# Patient Record
Sex: MALE | Race: WHITE | NOT HISPANIC OR LATINO
[De-identification: names, ages, dates, MRNs, and addresses within clinical notes are randomized per-mention and may not be internally consistent; named-entity substitution may affect disease eponyms.]

---
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
## 2022-01-06 ENCOUNTER — TRANSCRIPTION ENCOUNTER (OUTPATIENT)
Age: 22
End: 2022-01-06

## 2024-12-03 ENCOUNTER — EMERGENCY (EMERGENCY)
Facility: HOSPITAL | Age: 24
LOS: 0 days | Discharge: HOME | End: 2024-12-04
Attending: EMERGENCY MEDICINE
Payer: MEDICAID

## 2024-12-03 VITALS
TEMPERATURE: 99 F | DIASTOLIC BLOOD PRESSURE: 71 MMHG | WEIGHT: 140.88 LBS | OXYGEN SATURATION: 100 % | RESPIRATION RATE: 18 BRPM | HEART RATE: 107 BPM | SYSTOLIC BLOOD PRESSURE: 142 MMHG

## 2024-12-03 DIAGNOSIS — F32.A DEPRESSION, UNSPECIFIED: ICD-10-CM

## 2024-12-03 DIAGNOSIS — K92.0 HEMATEMESIS: ICD-10-CM

## 2024-12-03 DIAGNOSIS — Z87.19 PERSONAL HISTORY OF OTHER DISEASES OF THE DIGESTIVE SYSTEM: ICD-10-CM

## 2024-12-03 DIAGNOSIS — Z91.018 ALLERGY TO OTHER FOODS: ICD-10-CM

## 2024-12-03 DIAGNOSIS — R11.2 NAUSEA WITH VOMITING, UNSPECIFIED: ICD-10-CM

## 2024-12-03 DIAGNOSIS — R10.11 RIGHT UPPER QUADRANT PAIN: ICD-10-CM

## 2024-12-03 LAB
ALBUMIN SERPL ELPH-MCNC: 4.2 G/DL — SIGNIFICANT CHANGE UP (ref 3.3–5)
ALP SERPL-CCNC: 58 U/L — SIGNIFICANT CHANGE UP (ref 40–120)
ALT FLD-CCNC: 26 U/L — SIGNIFICANT CHANGE UP (ref 12–78)
ANION GAP SERPL CALC-SCNC: 1 MMOL/L — LOW (ref 5–17)
AST SERPL-CCNC: 19 U/L — SIGNIFICANT CHANGE UP (ref 15–37)
BASOPHILS # BLD AUTO: 0.02 K/UL — SIGNIFICANT CHANGE UP (ref 0–0.2)
BASOPHILS NFR BLD AUTO: 0.2 % — SIGNIFICANT CHANGE UP (ref 0–2)
BILIRUB SERPL-MCNC: 0.5 MG/DL — SIGNIFICANT CHANGE UP (ref 0.2–1.2)
BLD GP AB SCN SERPL QL: SIGNIFICANT CHANGE UP
BUN SERPL-MCNC: 15 MG/DL — SIGNIFICANT CHANGE UP (ref 7–23)
CALCIUM SERPL-MCNC: 9.3 MG/DL — SIGNIFICANT CHANGE UP (ref 8.5–10.1)
CHLORIDE SERPL-SCNC: 104 MMOL/L — SIGNIFICANT CHANGE UP (ref 96–108)
CO2 SERPL-SCNC: 28 MMOL/L — SIGNIFICANT CHANGE UP (ref 22–31)
CREAT SERPL-MCNC: 1.11 MG/DL — SIGNIFICANT CHANGE UP (ref 0.5–1.3)
EGFR: 96 ML/MIN/1.73M2 — SIGNIFICANT CHANGE UP
EOSINOPHIL # BLD AUTO: 0.05 K/UL — SIGNIFICANT CHANGE UP (ref 0–0.5)
EOSINOPHIL NFR BLD AUTO: 0.6 % — SIGNIFICANT CHANGE UP (ref 0–6)
FLUAV AG NPH QL: SIGNIFICANT CHANGE UP
FLUBV AG NPH QL: SIGNIFICANT CHANGE UP
GLUCOSE SERPL-MCNC: 133 MG/DL — HIGH (ref 70–99)
HCT VFR BLD CALC: 46.2 % — SIGNIFICANT CHANGE UP (ref 39–50)
HGB BLD-MCNC: 15.9 G/DL — SIGNIFICANT CHANGE UP (ref 13–17)
IMM GRANULOCYTES NFR BLD AUTO: 0.2 % — SIGNIFICANT CHANGE UP (ref 0–0.9)
LIDOCAIN IGE QN: 32 U/L — SIGNIFICANT CHANGE UP (ref 13–75)
LYMPHOCYTES # BLD AUTO: 0.59 K/UL — LOW (ref 1–3.3)
LYMPHOCYTES # BLD AUTO: 7.3 % — LOW (ref 13–44)
MCHC RBC-ENTMCNC: 30.2 PG — SIGNIFICANT CHANGE UP (ref 27–34)
MCHC RBC-ENTMCNC: 34.4 G/DL — SIGNIFICANT CHANGE UP (ref 32–36)
MCV RBC AUTO: 87.8 FL — SIGNIFICANT CHANGE UP (ref 80–100)
MONOCYTES # BLD AUTO: 0.46 K/UL — SIGNIFICANT CHANGE UP (ref 0–0.9)
MONOCYTES NFR BLD AUTO: 5.7 % — SIGNIFICANT CHANGE UP (ref 2–14)
NEUTROPHILS # BLD AUTO: 6.89 K/UL — SIGNIFICANT CHANGE UP (ref 1.8–7.4)
NEUTROPHILS NFR BLD AUTO: 86 % — HIGH (ref 43–77)
PLATELET # BLD AUTO: 186 K/UL — SIGNIFICANT CHANGE UP (ref 150–400)
POTASSIUM SERPL-MCNC: 3.6 MMOL/L — SIGNIFICANT CHANGE UP (ref 3.5–5.3)
POTASSIUM SERPL-SCNC: 3.6 MMOL/L — SIGNIFICANT CHANGE UP (ref 3.5–5.3)
PROT SERPL-MCNC: 7.8 GM/DL — SIGNIFICANT CHANGE UP (ref 6–8.3)
RBC # BLD: 5.26 M/UL — SIGNIFICANT CHANGE UP (ref 4.2–5.8)
RBC # FLD: 11.5 % — SIGNIFICANT CHANGE UP (ref 10.3–14.5)
RSV RNA NPH QL NAA+NON-PROBE: SIGNIFICANT CHANGE UP
SARS-COV-2 RNA SPEC QL NAA+PROBE: SIGNIFICANT CHANGE UP
SODIUM SERPL-SCNC: 133 MMOL/L — LOW (ref 135–145)
WBC # BLD: 8.03 K/UL — SIGNIFICANT CHANGE UP (ref 3.8–10.5)
WBC # FLD AUTO: 8.03 K/UL — SIGNIFICANT CHANGE UP (ref 3.8–10.5)

## 2024-12-03 PROCEDURE — 93005 ELECTROCARDIOGRAM TRACING: CPT

## 2024-12-03 PROCEDURE — 36415 COLL VENOUS BLD VENIPUNCTURE: CPT

## 2024-12-03 PROCEDURE — 99284 EMERGENCY DEPT VISIT MOD MDM: CPT

## 2024-12-03 PROCEDURE — 86900 BLOOD TYPING SEROLOGIC ABO: CPT

## 2024-12-03 PROCEDURE — 85025 COMPLETE CBC W/AUTO DIFF WBC: CPT

## 2024-12-03 PROCEDURE — 0241U: CPT

## 2024-12-03 PROCEDURE — 93010 ELECTROCARDIOGRAM REPORT: CPT

## 2024-12-03 PROCEDURE — 80053 COMPREHEN METABOLIC PANEL: CPT

## 2024-12-03 PROCEDURE — 86850 RBC ANTIBODY SCREEN: CPT

## 2024-12-03 PROCEDURE — 96374 THER/PROPH/DIAG INJ IV PUSH: CPT

## 2024-12-03 PROCEDURE — 86901 BLOOD TYPING SEROLOGIC RH(D): CPT

## 2024-12-03 PROCEDURE — 83690 ASSAY OF LIPASE: CPT

## 2024-12-03 PROCEDURE — 96375 TX/PRO/DX INJ NEW DRUG ADDON: CPT

## 2024-12-03 PROCEDURE — 99284 EMERGENCY DEPT VISIT MOD MDM: CPT | Mod: 25

## 2024-12-03 RX ORDER — SODIUM CHLORIDE 9 MG/ML
1000 INJECTION, SOLUTION INTRAMUSCULAR; INTRAVENOUS; SUBCUTANEOUS ONCE
Refills: 0 | Status: COMPLETED | OUTPATIENT
Start: 2024-12-03 | End: 2024-12-03

## 2024-12-03 RX ORDER — PANTOPRAZOLE SODIUM 40 MG/1
40 TABLET, DELAYED RELEASE ORAL ONCE
Refills: 0 | Status: COMPLETED | OUTPATIENT
Start: 2024-12-03 | End: 2024-12-03

## 2024-12-03 RX ORDER — ONDANSETRON HYDROCHLORIDE 4 MG/1
4 TABLET, FILM COATED ORAL ONCE
Refills: 0 | Status: COMPLETED | OUTPATIENT
Start: 2024-12-03 | End: 2024-12-03

## 2024-12-03 RX ADMIN — SODIUM CHLORIDE 1000 MILLILITER(S): 9 INJECTION, SOLUTION INTRAMUSCULAR; INTRAVENOUS; SUBCUTANEOUS at 22:05

## 2024-12-03 RX ADMIN — PANTOPRAZOLE SODIUM 40 MILLIGRAM(S): 40 TABLET, DELAYED RELEASE ORAL at 22:05

## 2024-12-03 RX ADMIN — ONDANSETRON HYDROCHLORIDE 4 MILLIGRAM(S): 4 TABLET, FILM COATED ORAL at 22:05

## 2024-12-03 NOTE — ED PROVIDER NOTE - OBJECTIVE STATEMENT
25 y/o M with PMHx of depression presents ambulatory to the ED c/o hematemesis. States he started having loose bms (4-5 episodes) and nausea starting Saturday 11/30. States he was recently dx with gastroenteritis on 11/30 and was feeling improve. States he ate a burger last night and normal food this morning. Reports today he had returning sx starting with nausea at 1pm and 3 episodes of emesis, one of which had blood in it at 8:30pm. States he felt better immediately after vomiting, but then broke into a cold sweat, became lightheaded, and had abd pain again. States these sx have self resolved. Denies eating any red foods. Pt last drank 1 glass of EtOH on 11/28. NKDA. PCP Harry. 25 y/o M with PMHx of depression presents ambulatory to the ED c/o hematemesis. States he started having loose BMs (4-5 episodes/d) and nausea starting Saturday 11/30. States he was recently Dx with gastroenteritis on 11/30 and was feeling improve. + Ate a burger last night and normal food this morning. Reports today he had returning sx starting with nausea at 1pm and 3 episodes of emesis, 3rd of which had blood in it, at 8:30pm. States he felt better immediately after vomiting, but then broke into a cold sweat & became lightheaded, and had some abd pain again. States these sx have since self-resolved. Denies eating any red foods. Pt last drank 1 glass of EtOH on 11/28. Currently no abd pain nor N/V, lightheaded.  NKDA. PCP Harry.

## 2024-12-03 NOTE — ED PROVIDER NOTE - NSFOLLOWUPINSTRUCTIONS_ED_ALL_ED_FT
please take zofran as needed for nausea and/or vomiting. please slowly reintroduce foods and start with a bland diet as your are recuperating.   please follow up with your Primary care doctor when you return home to [place].   Please return for any new or worsening symptoms.       Acute Nausea and Vomiting    WHAT YOU NEED TO KNOW:    What does acute mean? Acute means the nausea and vomiting starts suddenly, gets worse quickly, and lasts a short time.    What are some common causes of acute nausea and vomiting?    Food poisoning    Large amounts of alcohol    Certain medicines, too much of any medicine, or stopping a regular medicine too quickly    Early stages of pregnancy    Infection in the stomach, intestines, or other organs    Trauma to the head    Anxiety or stress    Gastroparesis (a condition that prevents your stomach from emptying properly)    Metabolic disorders, such as uremia or adrenal insufficiency  What causes acute nausea and vomiting with other signs and symptoms? You may have stomach pain or problems with digestion. You may be sweating, have pale skin, and more saliva than usual. These signs and symptoms may be caused by the following:    Problems with your heart rate, blood flow to your heart muscle, blood pressure, or stomach fluid    Increased pressure or bleeding in the brain    Swelling of the tissue covering the brain    Migraine or seizures    Inner ear disorders that cause problems with balance    Inflammation of the appendix, gallbladder, stomach, pancreas, kidneys, or other organs    Bacteria or a parasite in the digestive system    Heart attack    Stomach ulcers, or bowel blockage or twisting  How is the cause of acute nausea and vomiting diagnosed? Your healthcare provider will examine you and ask about your symptoms. Tell him or her if the vomiting was before, during, or after a meal. Your provider may ask what medicines you take, including over-the-counter medicines. You may need blood tests to check for infection or inflammation.    How is acute nausea and vomiting treated? Vomiting may go away on its own. The goal of treatment is to prevent dehydration. Treatment also depends on the cause of the nausea and vomiting. Any medical condition causing your nausea and vomiting will also be treated. You may need one or more of the following:    Medicines may be given to calm your stomach and stop your vomiting. You may also need medicines to help empty your stomach and bowels.    IV fluids may be given to replace lost fluids and electrolytes. This may be needed it you cannot drink liquids.    A nasogastric (NG) tube may be needed if your nausea and vomiting is severe. The NG tube is put into your nose and moved down your throat until it reaches your stomach. Liquid, nutrition, or medicine may be given through an NG tube. The tube may instead be attached to suction if healthcare providers need to keep your stomach empty.  What can I do to manage acute nausea and vomiting?    Rest as much as you can. Too much activity can make your nausea worse.    Drink more liquids to prevent dehydration. Take small sips. Try drinks such as ginger ale, lemonade, water, or tea. Your provider may recommend that you drink an oral rehydration solution (ORS). ORS contains water, salts, and sugar that are needed to replace the lost body fluids.    Eat smaller meals, more often. Try bland foods and avoid spices or strong flavors    Do not drink alcohol. Alcohol may upset or irritate your stomach.  Call your local emergency number (911 in the [place]) if:    You have chest pain.    You have severe pain or cramping in your abdomen.    Your vision is blurred.    You are confused, have a high fever, or a stiff neck.    You have bright red blood coming from your rectum.    Your vomit smells like bowel movement.  When should I seek immediate care?    You have a severe headache or pain.    You are dizzy, cold, and thirsty, and your eyes and mouth are dry.    You are urinating very little or not at all.    You are dizzy or lightheaded when you stand up.    You see blood or material that looks like coffee grounds in your vomit.  When should I call my doctor?    You continue to vomit for more than 48 hours.    Your nausea and vomiting does not get better or go away after you use medicine.    You have questions or concerns about your condition or care.

## 2024-12-03 NOTE — ED PROVIDER NOTE - CLINICAL SUMMARY MEDICAL DECISION MAKING FREE TEXT BOX
25 y/o M with PMHx of depression presents ambulatory to the ED c/o hematemesis. States he started having loose bms (4-5 episodes) and nausea starting Saturday 11/30. States he was recently dx with gastroenteritis on 11/30 and was feeling improve. Reports today he had returning sx starting with nausea at 1pm and 3 episodes of emesis, one of which had blood in it at 8:30pm.  Plan: EKG, labs including lipase, CBC, CMP, IVF, IV Zofran/Protonix, monitor, observe, reassess. 25 y/o M with PMHx of depression presents ambulatory to the ED c/o hematemesis. States he started having loose bms (4-5 episodes) and nausea starting Saturday 11/30. States he was recently dx with gastroenteritis on 11/30 and was feeling improve. Reports today he had returning sx starting with nausea at 1pm and 3 episodes of emesis, one of which had blood in it at 8:30pm.  Plan: EKG, labs including lipase, CBC, CMP, IVF, IV Zofran/Protonix, monitor, observe, reassess.    0150: Debora SILVA: s/o received form Dr. Martins, pending po chall and re-eval. patient tolerating po intake. rpt abdominal exam- nontender abdomen. he is feeling well. will send prn zofran odt to pharmacy and [place] home. 25 y/o M ambulatory to the ED c/o hematemesis x 1. 2+ dd. loose BMs (4-5 episodes/d) and nausea starting 11/30, Dx with gastroenteritis. +Feeling improved & began regular diet last renetta. Reports today he had returning sx starting with nausea at 1pm and 3 episodes of emesis ~ 8:30 pm, last of which had blood in it.  Plan: EKG, labs including lipase, CBC, CMP, IVF, IV Zofran/Protonix, monitor, observe, reassess. Clinically suspect MW tear related to episode forceful V/wretching.  Pt clinically stable. Expect DC home for ou8tpt f/u if studies benign, rpt eval benign incl. tolerates po trial.    0150: Debora SILVA: s/o received form Dr. Martins, pending po chall and re-eval. patient tolerating po intake. rpt abdominal exam- nontender abdomen. he is feeling well. will send prn zofran odt to pharmacy and dc home.

## 2024-12-03 NOTE — ED STATDOCS - PROGRESS NOTE DETAILS
I Elizabeth Ness attest that this documentation has been prepared under the direction and in the presence of Doctor Kendall   24 year old male who denies PMHx presents to ED c/o hematemesis PTA. he states he was recently diagnosed with gastroenteritis and was feeling improved however today had returning symptoms and had one episode of blood in his vomit 1 hour PTA. patient feels lightheaded, nauseous, vomiting blood and feels as if he is going to faint.  patient also tachycardic. Will send to main for full workup. I Elizabeth Ness attest that this documentation has been prepared under the direction and in the presence of Doctor Kendall   24 year old male who denies PMHx presents to ED c/o hematemesis PTA. he states he was recently diagnosed with gastroenteritis and was feeling improved however this AM but then had one episode of blood in his vomit 1 hour PTA. States lightheaded, like he is going to pass out and is also tachycardic. Pt is diaphoretic, pale and ill appearing. Will send to main for full workup.

## 2024-12-03 NOTE — ED ADULT TRIAGE NOTE - CHIEF COMPLAINT QUOTE
pt. presents with 1 episode of hematemesis 30 min PTA; noted to be bright red blood. denies eating anything with red in it today. pt. has had gastroenteritis since Saturday. endorsing n/v/diarrhea/ fevers/ chills. no meds taken PTA.

## 2024-12-03 NOTE — ED PROVIDER NOTE - BIRTH SEX
----- Message from Ml Ybarra DO sent at 3/25/2024 11:47 AM CDT -----  Her SIMONE showed   Right Ankle: 1.18-Normal resting ankle brachial index. 2. Left Ankle: 1.10-Normal resting ankle brachial index. She has no Peripheral arterial disease     3.  Right digital pressure is borderline decreased-she has some mild microvascular disease in her toes which is expected for her age  Cont aspirin Male

## 2024-12-03 NOTE — ED PROVIDER NOTE - PHYSICAL EXAMINATION
Gen: Young M adult, no respiratory discomfort, not acutely ill  Head: NC/AT  Eyes: PERRL, EOMI, conjunctivae pink  ENT: oropharynx clear, mucous membranes mildly dry  Card: regular rate and rhythm, normal radial pulse  Resp: Breath sounds clear bilaterally, respirations normal  : deferred, no CVAT  Abd: soft, trace RUQ ttp, Diaz's negative, bowel sounds hypoactive, normal pitch, no rebound, guarding or mass   Msk: MAEx4 without focal deformities, tenderness or swelling  Skin: no tactile warmth, no rash  Neuro: Alert and oriented, no focal deficits, no motor or sensory deficits Gen: Young M adult, no respiratory discomfort, not acutely ill  Head: NC/AT  Eyes: PERRL, EOMI, conjunctivae pink  ENT: oropharynx clear, mucous membranes mildly dry  Card: regular rate and rhythm, normal radial pulse  Resp: Breath sounds clear bilaterally, respirations normal  : deferred, no CVAT  Abd: soft, trace RUQ TTP, Diaz's negative, bowel sounds hypoactive, normal pitch, no rebound, guarding nor mass   Msk: MAEx4 without focal deformities, tenderness or swelling  Skin: no tactile warmth, no rash  Neuro: Alert and oriented, no focal deficits, no motor or sensory deficits, CNs intact, normal speech

## 2024-12-04 VITALS
TEMPERATURE: 98 F | SYSTOLIC BLOOD PRESSURE: 103 MMHG | HEART RATE: 89 BPM | DIASTOLIC BLOOD PRESSURE: 59 MMHG | OXYGEN SATURATION: 100 % | RESPIRATION RATE: 19 BRPM

## 2024-12-04 PROCEDURE — 93010 ELECTROCARDIOGRAM REPORT: CPT

## 2024-12-04 RX ORDER — ONDANSETRON HYDROCHLORIDE 4 MG/1
1 TABLET, FILM COATED ORAL
Qty: 9 | Refills: 0
Start: 2024-12-04 | End: 2024-12-06

## 2024-12-04 NOTE — ED ADULT NURSE NOTE - OBJECTIVE STATEMENT
pt. presents with 1 episode of hematemesis 30 min PTA; noted to be bright red blood. denies eating anything with red in it today. pt. has had gastroenteritis since Saturday. endorsing n/v/diarrhea/ fevers/ chills. ao x 4. ambulatory. in no acute distress. no pmh.

## 2024-12-04 NOTE — ED ADULT NURSE NOTE - NSFALLUNIVINTERV_ED_ALL_ED
Bed/Stretcher in lowest position, wheels locked, appropriate side rails in place/Call bell, personal items and telephone in reach/Instruct patient to call for assistance before getting out of bed/chair/stretcher/Non-slip footwear applied when patient is off stretcher/[place] to call system/Physically safe environment - no spills, clutter or unnecessary equipment/Purposeful proactive rounding/Room/bathroom lighting operational, light cord in reach

## 2024-12-04 NOTE — ED ADULT NURSE REASSESSMENT NOTE - NS ED NURSE REASSESS COMMENT FT1
As per MD Cazares, no EKG needed prior to discharge. MD stated to discontinue EKG order. Orders received.